# Patient Record
Sex: FEMALE | Race: WHITE | NOT HISPANIC OR LATINO | URBAN - NONMETROPOLITAN AREA
[De-identification: names, ages, dates, MRNs, and addresses within clinical notes are randomized per-mention and may not be internally consistent; named-entity substitution may affect disease eponyms.]

---

## 2021-05-13 ENCOUNTER — IMPORTED ENCOUNTER (OUTPATIENT)
Dept: URBAN - NONMETROPOLITAN AREA CLINIC 1 | Facility: CLINIC | Age: 86
End: 2021-05-13

## 2021-05-13 PROBLEM — H52.4: Noted: 2021-05-13

## 2021-05-13 PROBLEM — H02.831: Noted: 2021-05-13

## 2021-05-13 PROBLEM — H43.813: Noted: 2021-05-13

## 2021-05-13 PROBLEM — H00.022: Noted: 2021-05-13

## 2021-05-13 PROBLEM — Z96.1: Noted: 2021-05-13

## 2021-05-13 PROBLEM — H35.3111: Noted: 2021-05-13

## 2021-05-13 PROBLEM — H02.834: Noted: 2021-05-13

## 2021-05-13 PROCEDURE — 99204 OFFICE O/P NEW MOD 45 MIN: CPT

## 2021-05-13 NOTE — PATIENT DISCUSSION
Internal Hordeolum RLL- Discussed diagnosis in detail with patient - Recommend HOT compresses 10-15 mins on and 45 mins off- Start Keflex 500mg BID x 7 days RX sent to [address] [facility] baby shampoo to scrub lids daily - Consider lid consult with Dr. 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 - The risk of retinal detachment in patients with PVDs was discussed with the patient and the warning signs of retinal detachment were carefully reviewed with the patient. - The patient was warned to return to the office or contact the ophthalmologist on call immediately if they experience signs of retinal detachment or changes in vision noted from today. 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 Sample given 5/13/21- Recommend that patient start following the [address] patient to call or come into the office ASAP if any changes noted from today.  Gridgiven by Dr. 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 will call back if drops do not work and will rec. a drop for pain

## 2021-07-19 ENCOUNTER — IMPORTED ENCOUNTER (OUTPATIENT)
Dept: URBAN - NONMETROPOLITAN AREA CLINIC 1 | Facility: CLINIC | Age: 86
End: 2021-07-19

## 2021-07-19 PROBLEM — H02.831: Noted: 2021-05-13

## 2021-07-19 PROBLEM — H52.4: Noted: 2021-05-13

## 2021-07-19 PROBLEM — H02.834: Noted: 2021-05-13

## 2021-07-19 PROBLEM — H43.813: Noted: 2021-05-13

## 2021-07-19 PROBLEM — H35.3111: Noted: 2021-05-13

## 2021-07-19 PROBLEM — H16.223: Noted: 2021-07-19

## 2021-07-19 PROBLEM — Z96.1: Noted: 2021-05-13

## 2021-07-19 PROCEDURE — 99213 OFFICE O/P EST LOW 20 MIN: CPT

## 2021-07-19 NOTE — PATIENT DISCUSSION
CALI OU- Discussed diagnosis in detail with patient- Discussed signs and symptoms of progression- Recommend patient drinking plenty of water and starting Omega 3’s - Recommend Refresh or Systane  throughout the day- Start Pred BID OU x 4-5 days RX sent to pharmacy. Once done start Refresh or Genteal through out the day - Continue to monitorPREVIOUS NOTESPVD OU- Discussed findings of exam in detail with the patient. - The risk of retinal detachment in patients with PVDs was discussed with the patient and the warning signs of retinal detachment were carefully reviewed with the patient. - The patient was warned to return to the office or contact the ophthalmologist on call immediately if they experience signs of retinal detachment or changes in vision noted from today. 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 Sample given 5/13/21- Recommend that patient start following the [address] patient to call or come into the office ASAP if any changes noted from today.  Gridgiven by Dr. 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 will call back if drops do not work and will rec. a drop for pain

## 2021-11-17 ENCOUNTER — IMPORTED ENCOUNTER (OUTPATIENT)
Dept: URBAN - NONMETROPOLITAN AREA CLINIC 1 | Facility: CLINIC | Age: 86
End: 2021-11-17

## 2021-11-17 PROBLEM — H43.813: Noted: 2021-11-17

## 2021-11-17 PROBLEM — H02.831: Noted: 2021-11-17

## 2021-11-17 PROBLEM — H52.4: Noted: 2021-11-17

## 2021-11-17 PROBLEM — H16.223: Noted: 2021-11-17

## 2021-11-17 PROBLEM — Z96.1: Noted: 2021-11-17

## 2021-11-17 PROBLEM — H02.834: Noted: 2021-11-17

## 2021-11-17 PROBLEM — H35.3111: Noted: 2021-11-17

## 2021-11-17 PROCEDURE — 92134 CPTRZ OPH DX IMG PST SGM RTA: CPT

## 2021-11-17 PROCEDURE — 92015 DETERMINE REFRACTIVE STATE: CPT

## 2021-11-17 PROCEDURE — 99214 OFFICE O/P EST MOD 30 MIN: CPT

## 2021-11-17 NOTE — PATIENT DISCUSSION
ARMD OD- Discussed diagnosis in detail with patient- Recommend  patient start eye vitamins daily such as Preservision. Sample given 5/13/21- Recommend that patient start following the [address] patient to call or come into the office ASAP if any changes noted from today. Grid given by Dr. 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 - The risk of retinal detachment in patients with PVDs was discussed with the patient and the warning signs of retinal detachment were carefully reviewed with the patient. - The patient was warned to return to the office or contact the ophthalmologist on call immediately if they experience signs of retinal detachment or changes in vision noted from today.  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 will call back if drops do not work and will rec. a drop for painOCT MAC 11/17/21MR 11/17/21

## 2022-04-10 ASSESSMENT — TONOMETRY
OD_IOP_MMHG: 13
OD_IOP_MMHG: 16
OS_IOP_MMHG: 15
OS_IOP_MMHG: 16

## 2022-04-10 ASSESSMENT — VISUAL ACUITY
OS_CC: 20/70
OD_CC: 20/60+2
OD_CC: 20/50-
OS_PH: 20/60
OD_CC: 20/40+1
OS_CC: 20/70+2
OS_CC: 20/40